# Patient Record
Sex: FEMALE | Race: WHITE | Employment: UNEMPLOYED | URBAN - METROPOLITAN AREA
[De-identification: names, ages, dates, MRNs, and addresses within clinical notes are randomized per-mention and may not be internally consistent; named-entity substitution may affect disease eponyms.]

---

## 2017-11-29 NOTE — H&P
[facility] [address] [place] [address]  Phone: [phone]  Fax: [phone]           Patient: Sariah Alvaradodaylin   : 1960   Date of Encounter: 2017 11:11 AM   I had the pleasure of seeing Sariah Banks. Rita Rey in my office on the above date. The following is a description of that office visit. History of Present Illness    The patient is a 56year old female who presents for an evaluation of their finger. The patient describes having sharp pain. The problem is described as being located in the right long finger. The onset of the finger problem has been sudden following a specific incident (bungee cord pulled finger- pt put it back in place, \"keisha taped\" for a few weeks). The symptom has been occuring for 2 years. The symptom occurs intermittently (with squeezing). The course has been unchanged. The finger problem is described as moderate. The finger problem is aggravated by other (squeezing, swinging an axe). There has been no associated decreased ROM. Previous evaluation done by primary care physician. Previous diagnostic tests have included none. Patient previous treatment has included none. The patient's dominant hand is their right. There have been no previous injuries to this area. .    Additional reasons for visit:    Transition into care is described as the following: The patient is transitioning into care from another physician (Dr. Natanael Campos). I have reviewed the patient's reason for visit, review of systems, and past medical and social history as documented by my staff. Pertinent items were discussed with the patient. History   Allergy   Codeine/Codeine Derivatives: intolerant       Problem List/Past Medical   Thyroid cancer   Hypertension   GERD (gastroesophageal reflux disease)      Other Problems   Ganglion, finger of right hand: Patient wishes to be scheduled for a right long finger excision of mass.  She has evidence of a ganglion cyst or a mass in the right hand. Our plan is to proceed with surgical excision of this. Wrist and benefits of surgery were discussed briefly. Our plan is to coordinate this with the repair the collateral ligament of the right long finger. We will get this scheduled at her convenience and she will be reevaluated prior to. Dislocation of MCP joint of right middle finger, sequela: She reports that she continues to have instability of her right long finger. She states that she dislocated it is in a bungee cord. She states that she treated it conservatively herself. She states that over time that it has improved however she continues to have significant pain and discomfort. Associated with this is a large ganglion cyst or mass in the hand. We discussed options. Our plan is to excise the mass and proceed with surgical reconstruction of the collateral ligament with potential excision of the bony fragments. This would require use of a suture anchor. We discussed this with her. We will get this scheduled for her at her convenience. Injury of collateral ligament of finger of right hand, initial encounter: Patient wishes to be scheduled for a right long finger repair of collateral ligament. As discussed previously she injured the collateral ligament of the metacarpophalangeal joint of the right long finger. Specifically the radial. Our plan is to do a surgical repair. Past Surgical   Hysterectomy; Total:   Thyroidectomy; Total:   Social   Tobacco Use: Never smoker; No Drug Use:   Alcohol Use: Drinks wine; 3-4/week   Medications   Levothyroxine Sodium (137MCG Tablet, Oral daily) Active. Lisinopril (20MG Tablet, Oral daily) Active. AmLODIPine Besylate (5MG Tablet, Oral daily) Active. NexIUM (20MG Capsule DR, Oral daily) Active. Glucosamine HCl (1000MG Tablet, Oral two times daily) Active. Vitamin E (1000UNIT Capsule, Oral daily) Active. Vitamin C (1000MG Tablet, Oral two times daily) Active. DHEA (50MG Capsule, Oral daily) Active. Bupropion HCl (150MG Tablet ER, Oral two times daily) Active. Family   Family history unknown:;     Review of Systems    General Not Present- Chills and Fever. Skin Not Present- Bruising, Pallor and Skin Color Changes. Respiratory Not Present- Cough and Difficulty Breathing. Cardiovascular Not Present- Chest Pain and Fainting / Blacking Out. Musculoskeletal Present- Joint Pain and Joint Swelling. Not Present- Decreased Range of Motion. Neurological Not Present- Dysesthesia, Paresthesias and Weakness In Extremities. Hematology Not Present- Abnormal Bleeding and Petechiae. Physical Exam       General  Mental Status - Alert. General Appearance - Cooperative, Well groomed, Not in acute distress. Orientation - Oriented X3. Build & Nutrition - Well nourished. Musculoskeletal  Upper Extremity - Hand/Wrist: Wrist: Inspection and Palpation: Mass - size - 2 cm (Height) and 1 cm (Width). Hand - Evaluation of related systems reveals - no digital clubbing or cyanosis and neurovascularly intact bilaterally. Inspection and Palpation - Crepitus - no crepitus bilateral. Sensation is - normal, (R). Instability - Right - . Hand - Deformities/Malalignments/Discrepancies - no deformities, malalignments, or discrepancies. Phalanges: Right: Thumb - Functional Testing - Flexor Pollicis Longus is intact. Index Finger - Functional Testing - Flexor Digitorum Superficialis is intact and Flexor Digitorum Profundus is intact. Long Finger - Functional Testing - Flexor Digitorum Superficialis is intact and Flexor Digitorum Profundus is intact. Ring Finger - Functional Testing - Flexor Digitorum Superficialis is intact and Flexor Digitorum Profundus is intact. Small Finger - Functional Testing - Flexor Digitorum Superficialis is intact and Flexor Digitorum Profundus is intact.       Assessment & Plan Simeon Eden MD; 30/86/064401:96 PM)    Dislocation of MCP joint of right middle finger, sequela (225.6  S63.235S) Today's' Impression: She reports that she continues to have instability of her right long finger. She states that she dislocated it is in a bungee cord. She states that she treated it conservatively herself. She states that over time that it has improved however she continues to have significant pain and discomfort. Associated with this is a large ganglion cyst or mass in the hand. We discussed options. Our plan is to excise the mass and proceed with surgical reconstruction of the collateral ligament with potential excision of the bony fragments. This would require use of a suture anchor. We discussed this with her. We will get this scheduled for her at her convenience. Current Plans:   X-RAY EXAM OF HAND (80275) Right Hand 3 Views ; Routine ()   X-Ray Findings: Three views of the right hand were obtained. There is evidence of calcification within avulsion of the ligament of the metacarpophalangeal joint of the long finger. Bone is present. Joint however is reduced. Injury of collateral ligament of finger of right hand, initial encounter (959.5  S69.91XA)   Today's' Impression: Patient wishes to be scheduled for a right long finger repair of collateral ligament. As discussed previously she injured the collateral ligament of the metacarpophalangeal joint of the right long finger. Specifically the radial. Our plan is to do a surgical repair. Current Plans:   Preferred Dates for Surgery: At the convenience of the patient        Ganglion, finger of right hand (727.43  M67.441)   Today's' Impression: Patient wishes to be scheduled for a right long finger excision of mass. She has evidence of a ganglion cyst or a mass in the right hand. Our plan is to proceed with surgical excision of this. Wrist and benefits of surgery were discussed briefly. Our plan is to coordinate this with the repair the collateral ligament of the right long finger.  We will get this scheduled at her convenience and she will be reevaluated prior to. Current Plans:   List of current medications documented by the Provider () ; Routine ()   Preferred Dates for Surgery: At the convenience of the patient   General Patient Education          Voice recognition software may have been used to generate this report, which may have resulted in some phonetic based errors in grammar and contents. Even though attempts were made to correct all the mistakes, some may have been missed, and remain in the body of the document.              Irina Guy MD

## 2017-12-07 NOTE — H&P
[facility] [address] [place], [place] [address]  Phone: [phone]  Fax: [phone]           Patient: Kelly Alvaradolatanyajosr   : 1960   Date of Encounter: 2017 10:22 AM   I had the pleasure of seeing Kelly Arrington in my office on the above date. The following is a description of that office visit. History of Present Illness    The patient is a 56year old female who presents for a Recheck of Finger Problem. The problem is described as being located in the right long finger (recheck for possible right long finger excision of mass and repair collateral ligament on 2107). The onset of the finger problem has been sudden following a specific incident (bungee cord pulled finger- pt put it back in place, \"kesiha taped\" for a few weeks). The symptom has been occuring for 2 years. The symptom occurs intermittently (with squeezing). The course has been slightly improved (pain but finger motion is worsening). The finger problem is described as moderate. The finger problem is aggravated by other (squeezing, swinging an axe). There has been no associated decreased ROM. Previous evaluation done by primary care physician. The patient's dominant hand is their right. There have been no previous injuries to this area. .   I have reviewed the patient's reason for visit, review of systems, and past medical and social history as documented by my staff. Pertinent items were discussed with the patient. History   Allergy   Codeine/Codeine Derivatives: intolerant       Problem List/Past Medical   Thyroid cancer   Hypertension   GERD (gastroesophageal reflux disease)   Snapping lateral band, ligament laxity   Ganglion, finger of right hand   Dislocation of MCP joint of right middle finger, sequela   Injury of collateral ligament of finger of right hand, initial encounter      Past Surgical   Hysterectomy; Total:   Thyroidectomy; Total:   Social   Tobacco Use: Never smoker;    No Drug Use: Alcohol Use: Drinks wine; 3-4/week   Medications   AmLODIPine Besylate (5MG Tablet, Oral daily) Active. Bupropion HCl (150MG Tablet ER, Oral two times daily) Active. DHEA (50MG Capsule, Oral daily) Active. Glucosamine HCl (1000MG Tablet, Oral two times daily) Active. Levothyroxine Sodium (137MCG Tablet, Oral daily) Active. Lisinopril (20MG Tablet, Oral daily) Active. NexIUM (20MG Capsule DR, Oral daily) Active. Vitamin C (1000MG Tablet, Oral two times daily) Active. Family   Family history unknown:;     Review of Systems    General Not Present- Chills and Fever. Skin Not Present- Bruising, Pallor and Skin Color Changes. Respiratory Not Present- Cough and Difficulty Breathing. Cardiovascular Not Present- Chest Pain and Fainting / Blacking Out. Musculoskeletal Present- Decreased Range of Motion, Joint Pain and Joint Swelling. Neurological Not Present- Dysesthesia, Paresthesias and Weakness In Extremities. Hematology Not Present- Abnormal Bleeding and Petechiae. Pain Present- Currently in pain. Physical Exam       General  Mental Status - Alert. General Appearance - Cooperative and Well groomed, Not in acute distress, Not Sickly. Orientation - Oriented X4. Build & Nutrition - Well nourished and Well developed. Posture - Normal posture. Gait - Normal. Hydration - Well hydrated. Voice - Normal.    Integumentary  General Characteristics - Color - normal coloration of skin. Skin Moisture - normal skin moisture. Texture - normal skin texture. Chest and Lung Exam  Inspection - Chest Wall - Normal. Shape - Normal and Symmetric. Movements - Symmetrical. Accessory muscles - No use of accessory muscles in breathing. Auscultation - Breath sounds - Normal. Adventitious sounds - No Adventitious sounds. Cardiovascular  Auscultation - Heart Sounds - S1 WNL and S2 WNL, No S3. Murmurs & Other Heart Sounds - Auscultation of the heart reveals - No Murmurs.     Abdomen  Inspection - Inspection Normal. Musculoskeletal  Upper Extremity - Hand/Wrist: Wrist: Inspection and Palpation: Mass - size - .5 cm (Height) and .5 cm (Width). Hand - Evaluation of related systems reveals - no digital clubbing or cyanosis and neurovascularly intact bilaterally. Inspection and Palpation - Crepitus - no crepitus bilateral. Sensation is - normal, (R). Instability - Right - . Hand - Deformities/Malalignments/Discrepancies - no deformities, malalignments, or discrepancies. Phalanges: Right: Thumb - Functional Testing - Flexor Pollicis Longus is intact. Index Finger - Functional Testing - Flexor Digitorum Superficialis is intact and Flexor Digitorum Profundus is intact. Long Finger - Functional Testing - Flexor Digitorum Superficialis is intact and Flexor Digitorum Profundus is intact. Ring Finger - Functional Testing - Flexor Digitorum Superficialis is intact and Flexor Digitorum Profundus is intact. Small Finger - Functional Testing - Flexor Digitorum Superficialis is intact and Flexor Digitorum Profundus is intact. Assessment & Plan Ximena Hess MD; 97/71/209659:35 PM)    Ganglion, finger of right hand (727.43  M67.441)   Today's' Impression: She has evidence of a soft tissue mass. It has decreased in size 3 is located at the metacarpophalangeal joint ulnar collateral ligament. Our plan is to remove any tissue in this area during the repair the collateral ligament. She still has instability at the metacarpophalangeal joint. Further there is evidence of intrinsic involvement with snapping of the lateral band. Her plan is to initially approached the MCP joint and proceeded with repairing the lateral structures. Then if she does not improve with repair of the collateral ligament with motion. Then we will proceed with a release of the sagittal band were potentially imbrication of the sagittal band. She will receive IV sedation and local in order to allow fine-tuning the surgery. Current Plans:    The procedure was discussed with the patient and a written consent was obtained and questions were answered. Risks of the procedure were discussed and include but are not limited to infection, bleeding, nerve, vascular injury as well as the need for future procedures. It was also discussed the importance of compliance with the treatment directions and participation in the care of the treated extremity. Patient wishes to proceed with the planned RIGHT LONG finger excision of mass        Snapping lateral band, ligament laxity (728.4  M24.20)    Current Plans:       Injury of collateral ligament of finger of right hand, initial encounter (959.5  S69.91XA)   Today's' Impression: She has evidence of injury to the collateral ligament of the MCP joint as well is now a snapping lateral band of the PIP joint. We discussed this with her. We discussed options. We discussed surgery. We discussed doing this under local. She agrees and understands. This be done with IV sedation and local.    Current Plans:   List of current medications documented by the Provider () ; Routine ()   The procedure was discussed with the patient and a written consent was obtained and questions were answered. Risks of the procedure were discussed and include but are not limited to infection, bleeding, nerve, vascular injury as well as the need for future procedures. It was also discussed the importance of compliance with the treatment directions and participation in the care of the treated extremity. Patient wishes to proceed with the planned repair collateral ligament and possible release of lateral band at proximal interphalangeal joint on right long finger   General Patient Education          Voice recognition software may have been used to generate this report, which may have resulted in some phonetic based errors in grammar and contents.  Even though attempts were made to correct all the mistakes, some may have been missed, and remain in the body of the document.              Jurgen Paul MD

## 2017-12-08 ENCOUNTER — ANESTHESIA EVENT (OUTPATIENT)
Dept: SURGERY | Age: 57
End: 2017-12-08
Payer: COMMERCIAL

## 2017-12-08 RX ORDER — ASPIRIN 325 MG
500 TABLET, DELAYED RELEASE (ENTERIC COATED) ORAL DAILY
COMMUNITY

## 2017-12-08 RX ORDER — VITAMIN E 1000 UNIT
1000 CAPSULE ORAL 2 TIMES DAILY
COMMUNITY
Start: 2010-11-24

## 2017-12-08 RX ORDER — LEVOTHYROXINE SODIUM 137 UG/1
137 TABLET ORAL DAILY
COMMUNITY
Start: 2017-08-24

## 2017-12-08 RX ORDER — MENTHOL
1000 GEL (GRAM) TOPICAL DAILY
COMMUNITY
Start: 2010-11-24

## 2017-12-08 RX ORDER — LISINOPRIL 20 MG/1
20 TABLET ORAL DAILY
COMMUNITY

## 2017-12-08 RX ORDER — CALCIUM CARBONATE 200(500)MG
1000 TABLET,CHEWABLE ORAL 2 TIMES DAILY
COMMUNITY

## 2017-12-08 RX ORDER — AMLODIPINE BESYLATE 5 MG/1
5 TABLET ORAL DAILY
COMMUNITY

## 2017-12-08 RX ORDER — BUPROPION HYDROCHLORIDE 150 MG/1
150 TABLET, EXTENDED RELEASE ORAL 2 TIMES DAILY
COMMUNITY

## 2017-12-12 ENCOUNTER — HOSPITAL ENCOUNTER (OUTPATIENT)
Age: 57
Setting detail: OUTPATIENT SURGERY
Discharge: HOME | End: 2017-12-12
Attending: ORTHOPAEDIC SURGERY | Admitting: ORTHOPAEDIC SURGERY
Payer: COMMERCIAL

## 2017-12-12 ENCOUNTER — ANESTHESIA (OUTPATIENT)
Dept: SURGERY | Age: 57
End: 2017-12-12
Payer: COMMERCIAL

## 2017-12-12 VITALS
SYSTOLIC BLOOD PRESSURE: 117 MMHG | HEIGHT: 64 IN | OXYGEN SATURATION: 100 % | RESPIRATION RATE: 16 BRPM | DIASTOLIC BLOOD PRESSURE: 75 MMHG | BODY MASS INDEX: 24.41 KG/M2 | WEIGHT: 143 LBS | TEMPERATURE: 98.1 F | HEART RATE: 55 BPM

## 2017-12-12 PROBLEM — S69.91XA INJURY OF COLLATERAL LIGAMENT OF FINGER OF RIGHT HAND: Chronic | Status: ACTIVE | Noted: 2017-12-12

## 2017-12-12 PROBLEM — M24.20: Chronic | Status: ACTIVE | Noted: 2017-12-12

## 2017-12-12 PROBLEM — R22.31 FINGER MASS, RIGHT: Chronic | Status: ACTIVE | Noted: 2017-12-12

## 2017-12-12 LAB
ATRIAL RATE: 61 BPM
BUN BLD-MCNC: 19 MG/DL (ref 7–18)
CALCULATED P AXIS, ECG09: 40 DEGREES
CALCULATED R AXIS, ECG10: 36 DEGREES
CALCULATED T AXIS, ECG11: 64 DEGREES
CHLORIDE BLD-SCNC: 107 MMOL/L (ref 100–108)
DIAGNOSIS, 93000: NORMAL
GLUCOSE BLD STRIP.AUTO-MCNC: 100 MG/DL (ref 74–106)
HCG UR QL: NEGATIVE
HCT VFR BLD CALC: 35 % (ref 36–49)
HGB BLD-MCNC: 11.9 G/DL (ref 12–16)
P-R INTERVAL, ECG05: 130 MS
POTASSIUM BLD-SCNC: 4.1 MMOL/L (ref 3.5–5.5)
Q-T INTERVAL, ECG07: 428 MS
QRS DURATION, ECG06: 92 MS
QTC CALCULATION (BEZET), ECG08: 430 MS
SODIUM BLD-SCNC: 142 MMOL/L (ref 136–145)
VENTRICULAR RATE, ECG03: 61 BPM

## 2017-12-12 PROCEDURE — 76060000033 HC ANESTHESIA 1 TO 1.5 HR: Performed by: ORTHOPAEDIC SURGERY

## 2017-12-12 PROCEDURE — 76210000020 HC REC RM PH II FIRST 0.5 HR: Performed by: ORTHOPAEDIC SURGERY

## 2017-12-12 PROCEDURE — 74011250636 HC RX REV CODE- 250/636: Performed by: NURSE ANESTHETIST, CERTIFIED REGISTERED

## 2017-12-12 PROCEDURE — 77030032490 HC SLV COMPR SCD KNE COVD -B: Performed by: ORTHOPAEDIC SURGERY

## 2017-12-12 PROCEDURE — 77030021122 HC SPLNT MAT FST BSNM -A: Performed by: ORTHOPAEDIC SURGERY

## 2017-12-12 PROCEDURE — 76010000149 HC OR TIME 1 TO 1.5 HR: Performed by: ORTHOPAEDIC SURGERY

## 2017-12-12 PROCEDURE — 77030020753 HC CUF TRNQT 1BLA STRY -B: Performed by: ORTHOPAEDIC SURGERY

## 2017-12-12 PROCEDURE — 74011250636 HC RX REV CODE- 250/636

## 2017-12-12 PROCEDURE — 74011000250 HC RX REV CODE- 250

## 2017-12-12 PROCEDURE — C1713 ANCHOR/SCREW BN/BN,TIS/BN: HCPCS | Performed by: ORTHOPAEDIC SURGERY

## 2017-12-12 PROCEDURE — 74011250636 HC RX REV CODE- 250/636: Performed by: PHYSICIAN ASSISTANT

## 2017-12-12 PROCEDURE — 93005 ELECTROCARDIOGRAM TRACING: CPT

## 2017-12-12 PROCEDURE — 82947 ASSAY GLUCOSE BLOOD QUANT: CPT

## 2017-12-12 PROCEDURE — 74011000272 HC RX REV CODE- 272: Performed by: ORTHOPAEDIC SURGERY

## 2017-12-12 PROCEDURE — 77030018836 HC SOL IRR NACL ICUM -A: Performed by: ORTHOPAEDIC SURGERY

## 2017-12-12 PROCEDURE — 74011000250 HC RX REV CODE- 250: Performed by: ORTHOPAEDIC SURGERY

## 2017-12-12 PROCEDURE — 77030002933 HC SUT MCRYL J&J -A: Performed by: ORTHOPAEDIC SURGERY

## 2017-12-12 PROCEDURE — 81025 URINE PREGNANCY TEST: CPT

## 2017-12-12 PROCEDURE — 77030002922 HC SUT FBRWRE ARTH -B: Performed by: ORTHOPAEDIC SURGERY

## 2017-12-12 DEVICE — ANCHOR SUT MINI W/ 2-0 FIBERWIRE AND 2 NDL CRKSCR FT: Type: IMPLANTABLE DEVICE | Site: FINGER | Status: FUNCTIONAL

## 2017-12-12 RX ORDER — CEFAZOLIN SODIUM 2 G/50ML
2 SOLUTION INTRAVENOUS ONCE
Status: COMPLETED | OUTPATIENT
Start: 2017-12-12 | End: 2017-12-12

## 2017-12-12 RX ORDER — SODIUM CHLORIDE, SODIUM LACTATE, POTASSIUM CHLORIDE, CALCIUM CHLORIDE 600; 310; 30; 20 MG/100ML; MG/100ML; MG/100ML; MG/100ML
75 INJECTION, SOLUTION INTRAVENOUS CONTINUOUS
Status: DISCONTINUED | OUTPATIENT
Start: 2017-12-12 | End: 2017-12-12 | Stop reason: HOSPADM

## 2017-12-12 RX ORDER — HYDROCODONE BITARTRATE AND ACETAMINOPHEN 10; 325 MG/1; MG/1
1 TABLET ORAL
Qty: 20 TAB | Refills: 0 | Status: SHIPPED | OUTPATIENT
Start: 2017-12-12

## 2017-12-12 RX ORDER — SODIUM CHLORIDE 0.9 % (FLUSH) 0.9 %
5-10 SYRINGE (ML) INJECTION EVERY 8 HOURS
Status: DISCONTINUED | OUTPATIENT
Start: 2017-12-12 | End: 2017-12-12 | Stop reason: HOSPADM

## 2017-12-12 RX ORDER — MIDAZOLAM HYDROCHLORIDE 1 MG/ML
INJECTION, SOLUTION INTRAMUSCULAR; INTRAVENOUS AS NEEDED
Status: DISCONTINUED | OUTPATIENT
Start: 2017-12-12 | End: 2017-12-12 | Stop reason: HOSPADM

## 2017-12-12 RX ORDER — PROPOFOL 10 MG/ML
INJECTION, EMULSION INTRAVENOUS
Status: DISCONTINUED | OUTPATIENT
Start: 2017-12-12 | End: 2017-12-12 | Stop reason: HOSPADM

## 2017-12-12 RX ORDER — ONDANSETRON 4 MG/1
4 TABLET, ORALLY DISINTEGRATING ORAL
Qty: 10 TAB | Refills: 0 | Status: SHIPPED | OUTPATIENT
Start: 2017-12-12

## 2017-12-12 RX ORDER — LIDOCAINE HYDROCHLORIDE 20 MG/ML
INJECTION, SOLUTION EPIDURAL; INFILTRATION; INTRACAUDAL; PERINEURAL AS NEEDED
Status: DISCONTINUED | OUTPATIENT
Start: 2017-12-12 | End: 2017-12-12 | Stop reason: HOSPADM

## 2017-12-12 RX ORDER — KETOROLAC TROMETHAMINE 30 MG/ML
INJECTION, SOLUTION INTRAMUSCULAR; INTRAVENOUS AS NEEDED
Status: DISCONTINUED | OUTPATIENT
Start: 2017-12-12 | End: 2017-12-12 | Stop reason: HOSPADM

## 2017-12-12 RX ORDER — LIDOCAINE HYDROCHLORIDE 10 MG/ML
0.1 INJECTION INFILTRATION; PERINEURAL AS NEEDED
Status: DISCONTINUED | OUTPATIENT
Start: 2017-12-12 | End: 2017-12-12 | Stop reason: HOSPADM

## 2017-12-12 RX ORDER — SODIUM CHLORIDE 0.9 % (FLUSH) 0.9 %
5-10 SYRINGE (ML) INJECTION AS NEEDED
Status: DISCONTINUED | OUTPATIENT
Start: 2017-12-12 | End: 2017-12-12 | Stop reason: HOSPADM

## 2017-12-12 RX ORDER — ONDANSETRON 2 MG/ML
INJECTION INTRAMUSCULAR; INTRAVENOUS AS NEEDED
Status: DISCONTINUED | OUTPATIENT
Start: 2017-12-12 | End: 2017-12-12 | Stop reason: HOSPADM

## 2017-12-12 RX ADMIN — LIDOCAINE HYDROCHLORIDE 60 MG: 20 INJECTION, SOLUTION EPIDURAL; INFILTRATION; INTRACAUDAL; PERINEURAL at 07:40

## 2017-12-12 RX ADMIN — SODIUM CHLORIDE, SODIUM LACTATE, POTASSIUM CHLORIDE, AND CALCIUM CHLORIDE 75 ML/HR: 600; 310; 30; 20 INJECTION, SOLUTION INTRAVENOUS at 07:31

## 2017-12-12 RX ADMIN — KETOROLAC TROMETHAMINE 30 MG: 30 INJECTION, SOLUTION INTRAMUSCULAR; INTRAVENOUS at 07:40

## 2017-12-12 RX ADMIN — CEFAZOLIN SODIUM 2 G: 2 SOLUTION INTRAVENOUS at 07:38

## 2017-12-12 RX ADMIN — ONDANSETRON 4 MG: 2 INJECTION INTRAMUSCULAR; INTRAVENOUS at 07:40

## 2017-12-12 RX ADMIN — MIDAZOLAM HYDROCHLORIDE 2 MG: 1 INJECTION, SOLUTION INTRAMUSCULAR; INTRAVENOUS at 07:36

## 2017-12-12 RX ADMIN — PROPOFOL 140 MCG/KG/MIN: 10 INJECTION, EMULSION INTRAVENOUS at 07:46

## 2017-12-12 NOTE — ANESTHESIA POSTPROCEDURE EVALUATION
Post-Anesthesia Evaluation & Assessment    Visit Vitals    /75    Pulse (!) 55    Temp 36.7 °C (98.1 °F)    Resp 16    Ht 5' 4\" (1.626 m)    Wt 64.9 kg (143 lb)    SpO2 100%    BMI 24.55 kg/m2       Nausea/Vomiting: no nausea and no vomiting    Post-operative hydration adequate. Pain score (VAS): 0    Mental status & Level of consciousness: alert and oriented x 3    Neurological status: moves all extremities, sensation grossly intact    Pulmonary status: airway patent, no supplemental oxygen required    Complications related to anesthesia: none    Patient has met all discharge requirements.     Additional comments:        Gurinder Marx, CRNA

## 2017-12-12 NOTE — INTERVAL H&P NOTE
H&P Update:  Kacey Huggins was seen and examined. History and physical has been reviewed. The patient has been examined. There have been no significant clinical changes since the completion of the originally dated History and Physical.  Patient identified by surgeon; surgical site was confirmed by patient and surgeon.     Signed By: Tanner Valdivia MD     December 12, 2017 7:31 AM

## 2017-12-12 NOTE — PROGRESS NOTES
conducted a pre-surgery visit with Iris Lund, who is a 56 y.o.,female. The  provided the following Interventions:  Initiated a relationship of care and support. Offered prayer and assurance of continued prayers on patient's behalf. Plan:  Chaplains will continue to follow and will provide pastoral care on an as needed/requested basis.  recommends bedside caregivers page  on duty if patient shows signs of acute spiritual or emotional distress.   [facility]   [phone]

## 2017-12-12 NOTE — ANESTHESIA PREPROCEDURE EVALUATION
Anesthetic History     PONV          Review of Systems / Medical History  Patient summary reviewed and pertinent labs reviewed    Pulmonary  Within defined limits                 Neuro/Psych   Within defined limits           Cardiovascular    Hypertension                   GI/Hepatic/Renal                Endo/Other      Hypothyroidism       Other Findings   Comments:   Risk Factors for Postoperative nausea/vomiting:       History of postoperative nausea/vomiting? YES       Female? YES       Motion sickness? NO       Intended opioid administration for postoperative analgesia? NO      Smoking Abstinence  Current Smoker? NO  Elective Surgery? YES  Seen preoperatively by anesthesiologist or proxy prior to day of surgery? YES  Pt abstained from smoking 24 hours prior to anesthesia?  N/A         Physical Exam    Airway  Mallampati: II  TM Distance: 4 - 6 cm  Neck ROM: normal range of motion   Mouth opening: Normal     Cardiovascular    Rhythm: regular  Rate: normal         Dental    Dentition: Poor dentition     Pulmonary  Breath sounds clear to auscultation               Abdominal  GI exam deferred       Other Findings            Anesthetic Plan    ASA: 2  Anesthesia type: MAC and general - backup            Anesthetic plan and risks discussed with: Patient

## 2017-12-12 NOTE — OP NOTES
OPERATIVE NOTE    Patient: Tamara Boone MRN: [id number]  CSN: [id number]    YOB: 1960  Age: 56 y.o. Sex: female        Date of Procedure: 12/12/2017     Preoperative Diagnosis: right long finger collateral ligament injury ganglion  s69.91xa,m67.441    Postoperative Diagnosis: right long finger radial collateral ligament tear     Procedure: Procedure(s):    repair collateral ligament right long finger     Surgeon: Dimitri Vallejo MD      First Assistant:   Ralph Mayer  Anesthesia Staff: Anesthesiologist: Orlin Kelley MD  CRNA: Gurinder Marx CRNA    Anesthesia: MAC      Local Used: 6 cc 2% lidocaine and 0.05 % marcaine  50:50 mix    Fluid:  500  cc crystalloid    Tourniquet Time:   29   minutes @ 200mmHg    Estimated Blood Loss: < 10 cc  Specimens: * No specimens in log *     Complications: None; patient tolerated the procedure well. Narration of procedure : INDICATIONS FOR PROCEDURE: The patient is a pleasant, 56 y.o. She sustained an injury to her over the summer and reduced the joint herself. She developed a mass and instability which has been problematic for her. We discussed options and she elected to proceed with a surgical repar and excision of the mass. NARRATION OF PROCEDURE: After proper consent was obtained, the patient was   brought to the OR, received IV sedation. The limb had previously been   marked. The limb was prepped and draped. A proper time-out was taken. Local   anesthetic was injected. The limb was elevated, exsanguinated. Tourniquet   was inflated to 200 mmHg and set for a 29 minute duration. The curvilinear incision was made at the base of the  Middle finger at the  MCP a dorsal incision was made. Sharp dissection through the skin followed by blunt  dissection. Care was taken to protect the dorsal neurovascular bundles. The rent in the capsule was visualized. The capsule and sagital band were entered.   The radial collateral ligament was isolated and repaired using a Arthrex suture anchor. This restored stability. The sagital band was repaired using the 2-0 fiberwire. The tourniquet was released and hemostasis obtained with a bipolar. Skin closed with Monocryl interrupted and subcuticular. Xeroform dressing, fluffs, bulky dressing applied. A volar splint was applied. The patient  tolerated the procedure well without any   complication. Implants:   Implant Name Type Inv.  Item Serial No.  Lot No. LRB No. Used   ANCHOR SUT CRKSCR MINI FT 2-0 --  - [id number]   ANCHOR SUT CRKSCR MINI FT 2-0 --  [id number] ARTHJUAN   Right [address] MD Karen  12/12/2017  5:10 PM

## 2017-12-12 NOTE — BRIEF OP NOTE
BRIEF OPERATIVE NOTE      BRIEF OPERATIVE NOTE    Patient: Viridiana Salgado MRN: [id number]  CSN: [id number]    YOB: 1960  Age: 56 y.o. Sex: female        Date of Procedure: 12/12/2017     Preoperative Diagnosis: right long finger collateral ligament injury ganglion  s69.91xa,m67.441    Postoperative Diagnosis: * No post-op diagnosis entered *      Procedure: right long finger repair collateral ligament   Surgeon: Jose Guadalupe Kelley MD      First Assistant:   Carol Ann Champion  Anesthesia Staff: Anesthesiologist: Cindy Mancuso MD  CRNA: Clara Naylor CRNA    Anesthesia: MAC      Local Used: 6 2% lidocaine and 0.05 % marcaine  50:50 mix    Fluid:  500 cc crystalloid    Tourniquet Time:   29 minutes @  200  mmHg    Total Tourniquet Time:   Total Tourniquet Time Documented:  area (laterality) - 30 minutes  Total: area (laterality) - 30 minutes       Estimated Blood Loss: < 10 cc    Specimens: none    Implants:   Implant Name Type Inv. Item Serial No.  Lot No. LRB No. Used   ANCHOR SUT CRKSCR MINI FT 2-0 --  - [id number]   ANCHOR SUT CRKSCR MINI FT 2-0 --    [id number]   Right 1       Findings:torn radial collateral ligament    Complications: None; patient tolerated the procedure well.       Jose Guadalupe Kelley MD  12/12/2017  8:31 AM      .

## 2017-12-12 NOTE — DISCHARGE INSTRUCTIONS
Awilda Avila PA-C   Upper Extremity Surgery   Discharge Instructions   Please take the time to review the following instructions before you leave the hospital and use them as guidelines during your recovery from surgery. If you have any questions you may contact my office at [phone]. Wound Care/Dressing Changes:   Dont remove your dressing or get them wet. It isnt necessary to apply antibiotic ointment to your incisions. Sutures will be removed at your one week post-op visit. Staples (if you have them) are removed in two weeks. If you have steri-strips over your incision they will start to peel off in 7-10 days as you get them wet. They dont need to be removed prior to that. When they begin to peel off, you may remove them. They should all be removed by 14 days from your surgery. Showering/Bathing: You may shower after your surgery. Your dressing may NOT be removed for showering. Do not take a bath or get into a swimming pool or Jacuzzi until the incisions are completely healed. This may take about 14 days. Do not soak your incision under water. Sling: You are not required to wear your sling and should do so only as needed for comfort. You have no restrictions with regards to the movement of your shoulder. Please push to achieve full range of motion as soon as possible. Please follow motion instructions given at the time of surgery. Ice/Elevation   Continue ice consistently for 24 hours after surgery. After 48 hours, you should ice your hand 3 times per day, for 20 minutes at a time for the next 5 days. After one week from surgery, you may use ice as needed for pain and swelling. Elevate the extremity higher than your heart for the next 24 - 48 hours. If you get throbbing this is telling you to elevate the extremity. Diet:   You may advance to your regular diet as tolerated. Medication:   1.  You will be given a prescription for pain medication when you are discharged from the hospital. Take the medication as needed according to the directions on the prescription bottle. Possible side effects of the medication include dizziness, headache, nausea, vomiting, constipation and urinary retention. If you experience any of these side effects call the office so that we can assist you in relieving them. Discontinue the use of the pain medication if you develop itching, rash, shortness of breath or difficulties swallowing. If these symptoms become severe or arent relieved by discontinuing the medication you should seek immediate medical attention. Refills of pain medication are authorized during office hours only. (7 AM-3PM Mon. thru Fri.)    2. If you were prescribed Percocet/oxycodone you must have a written prescription. These medications legally cannot be called in to the pharmacy. 3. You may take over the counter Ibuprofen/Advil/Aleve between dosages of your pain medication if needed. Do not take Tylenol in addition to your pain medication as most of the pain medication already contains Tylenol. Do not exceed 3000mg of Tylenol per day. Ex: (hydrocodone 5/325g= 325mg of Tylenol)  4. You may resume the medication you were taking prior to surgery. Pain medication may change the effects of any antidepressant medication you are taking. If you have any questions about possible interactions between your regular medications and the pain medication you should consult the physician who prescribes your regular medications. Follow-up:   Check the letter for Follow-up appointment or call [phone] for questions.    DISCHARGE SUMMARY from Nurse    PATIENT INSTRUCTIONS:    After general anesthesia or intravenous sedation, for 24 hours or while taking prescription Narcotics:  · Limit your activities  · Do not drive and operate hazardous machinery  · Do not make important personal or business decisions  · Do  not drink alcoholic beverages  · If you have not urinated within 8 hours after discharge, please contact your surgeon on call. Report the following to your surgeon:  · Excessive pain, swelling, redness or odor of or around the surgical area  · Temperature over 100.5  · Nausea and vomiting lasting longer than 4 hours or if unable to take medications  · Any signs of decreased circulation or nerve impairment to extremity: change in color, persistent  numbness, tingling, coldness or increase pain  · Any questions    What to do at Home:  No smoking/ No tobacco products/ Avoid exposure to second hand smoke  Surgeon General's Warning:  Quitting smoking now greatly reduces serious risk to your health. Obesity, smoking, and sedentary lifestyle greatly increases your risk for illness    A healthy diet, regular physical exercise & weight monitoring are important for maintaining a healthy lifestyle    You may be retaining fluid if you have a history of heart failure or if you experience any of the following symptoms:  Weight gain of 3 pounds or more overnight or 5 pounds in a week, increased swelling in our hands or feet or shortness of breath while lying flat in bed. Please call your doctor as soon as you notice any of these symptoms; do not wait until your next office visit. Recognize signs and symptoms of STROKE:    F-face looks uneven    A-arms unable to move or move unevenly    S-speech slurred or non-existent    T-time-call 911 as soon as signs and symptoms begin-DO NOT go       Back to bed or wait to see if you get better-TIME IS BRAIN. Warning Signs of HEART ATTACK     Call 911 if you have these symptoms:   Chest discomfort. Most heart attacks involve discomfort in the center of the chest that lasts more than a few minutes, or that goes away and comes back. It can feel like uncomfortable pressure, squeezing, fullness, or pain.  Discomfort in other areas of the upper body.  Symptoms can include pain or discomfort in one or both arms, the back, neck, jaw, or stomach.  Shortness of breath with or without chest discomfort.  Other signs may include breaking out in a cold sweat, nausea, or lightheadedness. Don't wait more than five minutes to call 911 - MINUTES MATTER! Fast action can save your life. Calling 911 is almost always the fastest way to get lifesaving treatment. Emergency Medical Services staff can begin treatment when they arrive -- up to an hour sooner than if someone gets to the hospital by car. The discharge information has been reviewed with the patient. The patient verbalized understanding. Discharge medications reviewed with the patient and appropriate educational materials and side effects teaching were provided. DISCHARGE SUMMARY from Nurse    PATIENT INSTRUCTIONS:    After general anesthesia or intravenous sedation, for 24 hours or while taking prescription Narcotics:  · Limit your activities  · Do not drive and operate hazardous machinery  · Do not make important personal or business decisions  · Do  not drink alcoholic beverages  · If you have not urinated within 8 hours after discharge, please contact your surgeon on call. Report the following to your surgeon:  · Excessive pain, swelling, redness or odor of or around the surgical area  · Temperature over 100.5  · Nausea and vomiting lasting longer than 4 hours or if unable to take medications  · Any signs of decreased circulation or nerve impairment to extremity: change in color, persistent  numbness, tingling, coldness or increase pain  · Any questions    What to do at Home:  These are general instructions for a healthy lifestyle:    No smoking/ No tobacco products/ Avoid exposure to second hand smoke  Surgeon General's Warning:  Quitting smoking now greatly reduces serious risk to your health.     Obesity, smoking, and sedentary lifestyle greatly increases your risk for illness    A healthy diet, regular physical exercise & weight monitoring are important for maintaining a healthy lifestyle    You may be retaining fluid if you have a history of heart failure or if you experience any of the following symptoms:  Weight gain of 3 pounds or more overnight or 5 pounds in a week, increased swelling in our hands or feet or shortness of breath while lying flat in bed. Please call your doctor as soon as you notice any of these symptoms; do not wait until your next office visit. Recognize signs and symptoms of STROKE:    F-face looks uneven    A-arms unable to move or move unevenly    S-speech slurred or non-existent    T-time-call 911 as soon as signs and symptoms begin-DO NOT go       Back to bed or wait to see if you get better-TIME IS BRAIN. Warning Signs of HEART ATTACK     Call 911 if you have these symptoms:   Chest discomfort. Most heart attacks involve discomfort in the center of the chest that lasts more than a few minutes, or that goes away and comes back. It can feel like uncomfortable pressure, squeezing, fullness, or pain.  Discomfort in other areas of the upper body. Symptoms can include pain or discomfort in one or both arms, the back, neck, jaw, or stomach.  Shortness of breath with or without chest discomfort.  Other signs may include breaking out in a cold sweat, nausea, or lightheadedness. Don't wait more than five minutes to call 911 - MINUTES MATTER! Fast action can save your life. Calling 911 is almost always the fastest way to get lifesaving treatment. Emergency Medical Services staff can begin treatment when they arrive -- up to an hour sooner than if someone gets to the hospital by car. The discharge information has been reviewed with the patient. The patient verbalized understanding. Discharge medications reviewed with the patient and appropriate educational materials and side effects teaching were provided. Patient armband removed and given to patient to take home.   Patient was informed of the privacy risks if armband lost or stolen

## 2017-12-12 NOTE — IP AVS SNAPSHOT
[address] [place] [address] [address] 
[phone] Patient: Iris Lund MRN: [id number] WHT:8[birth year]10 About your hospitalization You were admitted on:  December 12, 2017 You last received care in the:  [facility] PHASE 2 RECOVERY You were discharged on:  December 12, 2017 Why you were hospitalized Your primary diagnosis was: Injury Of Collateral Ligament Of Finger Of Right Hand Your diagnoses also included:  Finger Mass, Right, Snapping Lateral Band Due To Ligament Laxity Things You Need To Do (next 8 weeks) Follow up with Corbin Serrano MD  
  
Phone:  [phone] Where:  [address] [facility] [address], [facility] Follow up with Kervin Patton MD  
If symptoms worsen, For suture removal, For wound re-check, as scheduled Phone:  [phone] Where:  [address], [address] [place] [id number] Discharge Orders None A check edel indicates which time of day the medication should be taken. My Medications TAKE these medications as instructed Instructions Each Dose to Equal  
 Morning Noon Evening Bedtime  
 amLODIPine 5 mg tablet Commonly known as:  Dorothy Oseguera Your last dose was: Your next dose is: Take 5 mg by mouth daily. 5 mg  
    
   
   
   
  
 ascorbic acid (vitamin C) 1,000 mg tablet Commonly known as:  VITAMIN C Your last dose was: Your next dose is: Take 1,000 mg by mouth two (2) times a day. 1000 mg  
    
   
   
   
  
 buPROPion  mg SR tablet Commonly known as:  Blossom Bain Your last dose was: Your next dose is: Take 150 mg by mouth two (2) times a day. 150 mg FISH OIL 60- mg Cap Generic drug:  omega 3-dha-epa-fish oil Your last dose was: Your next dose is: Take 500 mg by mouth daily. 500 mg GLUCOSAMINE 1500 COMPLEX PO Your last dose was: Your next dose is: Take 1,000 mg by mouth daily. 1000 mg HYDROcodone-acetaminophen  mg tablet Commonly known as:  Renee Chappell Your last dose was: Your next dose is: Take 1 Tab by mouth every six (6) hours as needed for Pain. Max Daily Amount: 4 Tabs. 1 Tab  
    
   
   
   
  
 levothyroxine 137 mcg tablet Commonly known as:  SYNTHROID Your last dose was: Your next dose is: Take 137 mcg by mouth daily. 137 mcg  
    
   
   
   
  
 lisinopril 20 mg tablet Commonly known as:  Phillips Agustin Your last dose was: Your next dose is: Take 20 mg by mouth daily. 20 mg  
    
   
   
   
  
 ondansetron 4 mg disintegrating tablet Commonly known as:  ZOFRAN ODT Your last dose was: Your next dose is: Take 1 Tab by mouth every eight (8) hours as needed for Nausea. Indications: PREVENTION OF POST-OPERATIVE NAUSEA AND VOMITING  
 4 mg  
    
   
   
   
  
 prasterone (dhea) 50 mg Cap Your last dose was: Your next dose is: Take 50 mg by mouth daily. 50 mg  
    
   
   
   
  
 TUMS 200 mg calcium (500 mg) Chew Generic drug:  calcium carbonate Your last dose was: Your next dose is: Take 1,000 mg by mouth two (2) times a day. 1000 mg  
    
   
   
   
  
 vitamin e 1,000 unit capsule Commonly known as:  E GEMS Your last dose was: Your next dose is: Take 1,000 Units by mouth daily. 1000 Units Where to Get Your Medications Information on where to get these meds will be given to you by the nurse or doctor. ! Ask your nurse or doctor about these medications HYDROcodone-acetaminophen  mg tablet  
 ondansetron 4 mg disintegrating tablet Discharge Instructions Jaquan Martinez PA-C Upper Extremity Surgery Discharge Instructions Please take the time to review the following instructions before you leave the hospital and use them as guidelines during your recovery from surgery. If you have any questions you may contact my office at [phone]. Wound Care/Dressing Changes:  
Dont remove your dressing or get them wet. It isnt necessary to apply antibiotic ointment to your incisions. Sutures will be removed at your one week post-op visit. Staples (if you have them) are removed in two weeks. If you have steri-strips over your incision they will start to peel off in 7-10 days as you get them wet. They dont need to be removed prior to that. When they begin to peel off, you may remove them. They should all be removed by 14 days from your surgery. Showering/Bathing: You may shower after your surgery. Your dressing may NOT be removed for showering. Do not take a bath or get into a swimming pool or Jacuzzi until the incisions are completely healed. This may take about 14 days. Do not soak your incision under water. Sling: You are not required to wear your sling and should do so only as needed for comfort. You have no restrictions with regards to the movement of your shoulder. Please push to achieve full range of motion as soon as possible. Please follow motion instructions given at the time of surgery. Ice/Elevation Continue ice consistently for 24 hours after surgery. After 48 hours, you should ice your hand 3 times per day, for 20 minutes at a time for the next 5 days. After one week from surgery, you may use ice as needed for pain and swelling. Elevate the extremity higher than your heart for the next 24 - 48 hours. If you get throbbing this is telling you to elevate the extremity. Diet:  
You may advance to your regular diet as tolerated. Medication: 1. You will be given a prescription for pain medication when you are discharged from the hospital. Take the medication as needed according to the directions on the prescription bottle. Possible side effects of the medication include dizziness, headache, nausea, vomiting, constipation and urinary retention. If you experience any of these side effects call the office so that we can assist you in relieving them. Discontinue the use of the pain medication if you develop itching, rash, shortness of breath or difficulties swallowing. If these symptoms become severe or arent relieved by discontinuing the medication you should seek immediate medical attention. Refills of pain medication are authorized during office hours only. (7 AM-3PM Mon. thru Fri.) 2. If you were prescribed Percocet/oxycodone you must have a written prescription. These medications legally cannot be called in to the pharmacy. 3. You may take over the counter Ibuprofen/Advil/Aleve between dosages of your pain medication if needed. Do not take Tylenol in addition to your pain medication as most of the pain medication already contains Tylenol. Do not exceed 3000mg of Tylenol per day. Ex: (hydrocodone 5/325g= 325mg of Tylenol) 4. You may resume the medication you were taking prior to surgery. Pain medication may change the effects of any antidepressant medication you are taking. If you have any questions about possible interactions between your regular medications and the pain medication you should consult the physician who prescribes your regular medications. Follow-up:   Check the letter for Follow-up appointment or call [phone] for questions. DISCHARGE SUMMARY from Nurse PATIENT INSTRUCTIONS: 
 
After general anesthesia or intravenous sedation, for 24 hours or while taking prescription Narcotics: · Limit your activities · Do not drive and operate hazardous machinery · Do not make important personal or business decisions · Do  not drink alcoholic beverages · If you have not urinated within 8 hours after discharge, please contact your surgeon on call. Report the following to your surgeon: 
· Excessive pain, swelling, redness or odor of or around the surgical area · Temperature over 100.5 · Nausea and vomiting lasting longer than 4 hours or if unable to take medications · Any signs of decreased circulation or nerve impairment to extremity: change in color, persistent  numbness, tingling, coldness or increase pain · Any questions What to do at Home: No smoking/ No tobacco products/ Avoid exposure to second hand smoke Surgeon General's Warning:  Quitting smoking now greatly reduces serious risk to your health. Obesity, smoking, and sedentary lifestyle greatly increases your risk for illness A healthy diet, regular physical exercise & weight monitoring are important for maintaining a healthy lifestyle You may be retaining fluid if you have a history of heart failure or if you experience any of the following symptoms:  Weight gain of 3 pounds or more overnight or 5 pounds in a week, increased swelling in our hands or feet or shortness of breath while lying flat in bed. Please call your doctor as soon as you notice any of these symptoms; do not wait until your next office visit. Recognize signs and symptoms of STROKE: 
 
F-face looks uneven A-arms unable to move or move unevenly S-speech slurred or non-existent T-time-call 911 as soon as signs and symptoms begin-DO NOT go Back to bed or wait to see if you get better-TIME IS BRAIN. Warning Signs of HEART ATTACK Call 911 if you have these symptoms: 
? Chest discomfort. Most heart attacks involve discomfort in the center of the chest that lasts more than a few minutes, or that goes away and comes back. It can feel like uncomfortable pressure, squeezing, fullness, or pain. ? Discomfort in other areas of the upper body.  Symptoms can include pain or discomfort in one or both arms, the back, neck, jaw, or stomach. ? Shortness of breath with or without chest discomfort. ? Other signs may include breaking out in a cold sweat, nausea, or lightheadedness. Don't wait more than five minutes to call [address]! Fast action can save your life. Calling 911 is almost always the fastest way to get lifesaving treatment. Emergency Medical Services staff can begin treatment when they arrive  up to an hour sooner than if someone gets to the hospital by car. The discharge information has been reviewed with the patient. The patient verbalized understanding. Discharge medications reviewed with the patient and appropriate educational materials and side effects teaching were provided. DISCHARGE SUMMARY from Nurse PATIENT INSTRUCTIONS: 
 
After general anesthesia or intravenous sedation, for 24 hours or while taking prescription Narcotics: · Limit your activities · Do not drive and operate hazardous machinery · Do not make important personal or business decisions · Do  not drink alcoholic beverages · If you have not urinated within 8 hours after discharge, please contact your surgeon on call. Report the following to your surgeon: 
· Excessive pain, swelling, redness or odor of or around the surgical area · Temperature over 100.5 · Nausea and vomiting lasting longer than 4 hours or if unable to take medications · Any signs of decreased circulation or nerve impairment to extremity: change in color, persistent  numbness, tingling, coldness or increase pain · Any questions What to do at Home: These are general instructions for a healthy lifestyle: No smoking/ No tobacco products/ Avoid exposure to second hand smoke Surgeon General's Warning:  Quitting smoking now greatly reduces serious risk to your health. Obesity, smoking, and sedentary lifestyle greatly increases your risk for illness A healthy diet, regular physical exercise & weight monitoring are important for maintaining a healthy lifestyle You may be retaining fluid if you have a history of heart failure or if you experience any of the following symptoms:  Weight gain of 3 pounds or more overnight or 5 pounds in a week, increased swelling in our hands or feet or shortness of breath while lying flat in bed. Please call your doctor as soon as you notice any of these symptoms; do not wait until your next office visit. Recognize signs and symptoms of STROKE: 
 
F-face looks uneven A-arms unable to move or move unevenly S-speech slurred or non-existent T-time-call 911 as soon as signs and symptoms begin-DO NOT go Back to bed or wait to see if you get better-TIME IS BRAIN. Warning Signs of HEART ATTACK Call 911 if you have these symptoms: 
? Chest discomfort. Most heart attacks involve discomfort in the center of the chest that lasts more than a few minutes, or that goes away and comes back. It can feel like uncomfortable pressure, squeezing, fullness, or pain. ? Discomfort in other areas of the upper body. Symptoms can include pain or discomfort in one or both arms, the back, neck, jaw, or stomach. ? Shortness of breath with or without chest discomfort. ? Other signs may include breaking out in a cold sweat, nausea, or lightheadedness. Don't wait more than five minutes to call [address]! Fast action can save your life. Calling 911 is almost always the fastest way to get lifesaving treatment. Emergency Medical Services staff can begin treatment when they arrive  up to an hour sooner than if someone gets to the hospital by car. The discharge information has been reviewed with the patient. The patient verbalized understanding. Discharge medications reviewed with the patient and appropriate educational materials and side effects teaching were provided. Patient armband removed and given to patient to take home. Patient was informed of the privacy risks if armband lost or stolen Introducing \[facility]\"" & HEALTH SERVICES! Dear Néstor Graves: 
Thank you for requesting a [employer] account. Our records indicate that you already have an active [employer] account. You can access your account anytime at [email or web address] Did you know that you can access your hospital and ER discharge instructions at any time in [employer]? You can also review all of your test results from your hospital stay or ER visit. Additional Information If you have questions, please visit the Frequently Asked Questions section of the [employer] website at [email or web address] Remember, [employer] is NOT to be used for urgent needs. For medical emergencies, dial 911. Now available from your iPhone and Android! Unresulted Labs-Please follow up with your PCP about these lab tests Order Current Status EKG, 12 LEAD, INITIAL Preliminary result Providers Seen During Your Hospitalization Provider Specialty Primary office phone Jill Mariee MD Orthopedic Surgery [phone] Your Primary Care Physician (PCP) Primary Care Physician Office Phone Office Fax [facility] [phone] You are allergic to the following Allergen Reactions Codeine Nausea and Vomiting Recent Documentation Height Weight BMI OB Status Smoking Status 1.626 m 64.9 kg 24.55 kg/m2 Hysterectomy Never Smoker Emergency Contacts Name Discharge Info Relation Home Work Mobile [phone] DISCHARGE CAREGIVER [3] Life Partner [7] [phone] Patient Belongings The following personal items are in your possession at time of discharge: 
  Dental Appliances: None  Visual Aid: None Please provide this summary of care documentation to your next provider. Signatures-by signing, you are acknowledging that this After Visit Summary has been reviewed with you and you have received a copy. Patient Signature:  ____________________________________________________________ Date:  ____________________________________________________________  
  
Gwenyth Miles Provider Signature:  ____________________________________________________________ Date:  ____________________________________________________________

## (undated) DEVICE — SPLINT MAT XF SPEC 5X30IN --

## (undated) DEVICE — SOLUTION IV 1000ML 0.9% SOD CHL

## (undated) DEVICE — PADDING CAST W4INXL4YD ST COT COHESIVE HND TEARABLE SPEC

## (undated) DEVICE — BIPOLAR FORCEPS CORD,BANANA LEADS: Brand: VALLEYLAB

## (undated) DEVICE — SYR 10ML CTRL LR LCK NSAF LF --

## (undated) DEVICE — DISPOSABLE TOURNIQUET CUFF SINGLE BLADDER, SINGLE PORT AND QUICK CONNECT CONNECTOR: Brand: COLOR CUFF

## (undated) DEVICE — KIT PROC EXTRM HND FT CUST LF --

## (undated) DEVICE — PREP SKN CHLRAPRP 26ML TNT -- CONVERT TO ITEM 373320

## (undated) DEVICE — NEEDLE HYPO 25GA L1.5IN BLU POLYPR HUB S STL REG BVL STR

## (undated) DEVICE — KENDALL SCD EXPRESS SLEEVES, KNEE LENGTH, MEDIUM: Brand: KENDALL SCD

## (undated) DEVICE — SPLINT ORTH W4XL15IN PLSTR OF PARIS LO EXOTHERM SMOOTH

## (undated) DEVICE — DRESSING,GAUZE,XEROFORM,CURAD,1"X8",ST: Brand: CURAD

## (undated) DEVICE — GAMMEX® NON-LATEX SIZE 8, STERILE NEOPRENE POWDER-FREE SURGICAL GLOVE: Brand: GAMMEX

## (undated) DEVICE — NDL PRT INJ NSAF BLNT 18GX1.5 --

## (undated) DEVICE — ST BIAS STOCKINETTE: Brand: DEROYAL

## (undated) DEVICE — SUTURE MCRYL SZ 3-0 L27IN ABSRB UD L19MM PS-2 3/8 CIR PRIM Y427H

## (undated) DEVICE — DECANTER VI C-FLO LF --

## (undated) DEVICE — BANDAGE COMPR 9 FTX4 IN SMOOTH COMFORTABLE SYNTH ESMRK LF

## (undated) DEVICE — SUTURE FIBERWIRE 2-0 L18IN NONABSORBABLE BLU L17.9MM 3/8 AR7220